# Patient Record
Sex: FEMALE | Race: WHITE | URBAN - NONMETROPOLITAN AREA
[De-identification: names, ages, dates, MRNs, and addresses within clinical notes are randomized per-mention and may not be internally consistent; named-entity substitution may affect disease eponyms.]

---

## 2023-02-14 ENCOUNTER — HOSPITAL ENCOUNTER (EMERGENCY)
Facility: HOSPITAL | Age: 53
Discharge: HOME | End: 2023-02-14
Attending: PHYSICIAN ASSISTANT | Admitting: PHYSICIAN ASSISTANT
Payer: COMMERCIAL

## 2023-02-14 VITALS
RESPIRATION RATE: 18 BRPM | TEMPERATURE: 98.2 F | OXYGEN SATURATION: 97 % | SYSTOLIC BLOOD PRESSURE: 152 MMHG | DIASTOLIC BLOOD PRESSURE: 88 MMHG | HEART RATE: 90 BPM

## 2023-02-14 DIAGNOSIS — R00.2 PALPITATIONS: ICD-10-CM

## 2023-02-14 DIAGNOSIS — U07.1 INFECTION DUE TO 2019 NOVEL CORONAVIRUS: Primary | ICD-10-CM

## 2023-02-14 LAB
ANION GAP SERPL CALCULATED.3IONS-SCNC: 12 MMOL/L (ref 7–15)
BASOPHILS # BLD AUTO: 0.1 10E3/UL (ref 0–0.2)
BASOPHILS NFR BLD AUTO: 1 %
BUN SERPL-MCNC: 4.9 MG/DL (ref 6–20)
CALCIUM SERPL-MCNC: 9.4 MG/DL (ref 8.6–10)
CHLORIDE SERPL-SCNC: 96 MMOL/L (ref 98–107)
CREAT SERPL-MCNC: 0.69 MG/DL (ref 0.51–0.95)
D DIMER PPP FEU-MCNC: 0.36 UG/ML FEU (ref 0–0.5)
DEPRECATED HCO3 PLAS-SCNC: 26 MMOL/L (ref 22–29)
EOSINOPHIL # BLD AUTO: 0 10E3/UL (ref 0–0.7)
EOSINOPHIL NFR BLD AUTO: 0 %
ERYTHROCYTE [DISTWIDTH] IN BLOOD BY AUTOMATED COUNT: 12 % (ref 10–15)
GFR SERPL CREATININE-BSD FRML MDRD: >90 ML/MIN/1.73M2
GLUCOSE SERPL-MCNC: 115 MG/DL (ref 70–99)
HCT VFR BLD AUTO: 41.1 % (ref 35–47)
HGB BLD-MCNC: 14 G/DL (ref 11.7–15.7)
HOLD SPECIMEN: NORMAL
HOLD SPECIMEN: NORMAL
IMM GRANULOCYTES # BLD: 0 10E3/UL
IMM GRANULOCYTES NFR BLD: 1 %
LYMPHOCYTES # BLD AUTO: 1.4 10E3/UL (ref 0.8–5.3)
LYMPHOCYTES NFR BLD AUTO: 21 %
MCH RBC QN AUTO: 30.1 PG (ref 26.5–33)
MCHC RBC AUTO-ENTMCNC: 34.1 G/DL (ref 31.5–36.5)
MCV RBC AUTO: 88 FL (ref 78–100)
MONOCYTES # BLD AUTO: 1 10E3/UL (ref 0–1.3)
MONOCYTES NFR BLD AUTO: 16 %
NEUTROPHILS # BLD AUTO: 4.1 10E3/UL (ref 1.6–8.3)
NEUTROPHILS NFR BLD AUTO: 61 %
NRBC # BLD AUTO: 0 10E3/UL
NRBC BLD AUTO-RTO: 0 /100
PLATELET # BLD AUTO: 358 10E3/UL (ref 150–450)
POTASSIUM SERPL-SCNC: 3.9 MMOL/L (ref 3.4–5.3)
RBC # BLD AUTO: 4.65 10E6/UL (ref 3.8–5.2)
SODIUM SERPL-SCNC: 134 MMOL/L (ref 136–145)
TROPONIN T SERPL HS-MCNC: <6 NG/L
WBC # BLD AUTO: 6.6 10E3/UL (ref 4–11)

## 2023-02-14 PROCEDURE — 85041 AUTOMATED RBC COUNT: CPT | Performed by: PHYSICIAN ASSISTANT

## 2023-02-14 PROCEDURE — 36415 COLL VENOUS BLD VENIPUNCTURE: CPT | Performed by: PHYSICIAN ASSISTANT

## 2023-02-14 PROCEDURE — 85379 FIBRIN DEGRADATION QUANT: CPT | Performed by: PHYSICIAN ASSISTANT

## 2023-02-14 PROCEDURE — 82310 ASSAY OF CALCIUM: CPT | Performed by: PHYSICIAN ASSISTANT

## 2023-02-14 PROCEDURE — 84484 ASSAY OF TROPONIN QUANT: CPT | Performed by: PHYSICIAN ASSISTANT

## 2023-02-14 PROCEDURE — 93005 ELECTROCARDIOGRAM TRACING: CPT

## 2023-02-14 PROCEDURE — 93010 ELECTROCARDIOGRAM REPORT: CPT | Performed by: INTERNAL MEDICINE

## 2023-02-14 PROCEDURE — 99284 EMERGENCY DEPT VISIT MOD MDM: CPT | Performed by: PHYSICIAN ASSISTANT

## 2023-02-14 PROCEDURE — 85014 HEMATOCRIT: CPT | Performed by: PHYSICIAN ASSISTANT

## 2023-02-14 PROCEDURE — 99284 EMERGENCY DEPT VISIT MOD MDM: CPT

## 2023-02-14 RX ORDER — GINSENG 100 MG
50 CAPSULE ORAL
COMMUNITY

## 2023-02-14 RX ORDER — LISINOPRIL 10 MG/1
1 TABLET ORAL DAILY
COMMUNITY
Start: 2022-06-15

## 2023-02-14 RX ORDER — CYANOCOBALAMIN (VITAMIN B-12) 500 MCG
LOZENGE ORAL DAILY
COMMUNITY

## 2023-02-14 RX ORDER — SPIRONOLACTONE 25 MG/1
12.5 TABLET ORAL
COMMUNITY
Start: 2022-03-11

## 2023-02-14 RX ORDER — METFORMIN HCL 500 MG
1500 TABLET, EXTENDED RELEASE 24 HR ORAL
COMMUNITY
Start: 2022-05-17

## 2023-02-14 RX ORDER — ASCORBIC ACID 125 MG
TABLET,CHEWABLE ORAL
COMMUNITY

## 2023-02-14 RX ORDER — AMLODIPINE BESYLATE 5 MG/1
1 TABLET ORAL DAILY
COMMUNITY
Start: 2023-01-30

## 2023-02-14 RX ORDER — ESTRADIOL 0.1 MG/G
CREAM VAGINAL
COMMUNITY
Start: 2022-01-12

## 2023-02-14 ASSESSMENT — ENCOUNTER SYMPTOMS
COUGH: 1
CHILLS: 0
FATIGUE: 0
SORE THROAT: 1
SHORTNESS OF BREATH: 1
LIGHT-HEADEDNESS: 0
ACTIVITY CHANGE: 0
NAUSEA: 0
PALPITATIONS: 1
DIZZINESS: 0
FEVER: 0
HEADACHES: 1
APPETITE CHANGE: 0
VOMITING: 0

## 2023-02-14 ASSESSMENT — ACTIVITIES OF DAILY LIVING (ADL): ADLS_ACUITY_SCORE: 35

## 2023-02-14 NOTE — ED NOTES
"Pt presents today with having increased HR. Pt stated that its been elevated since her physical in January in the 115's, today at work she saw it at 128. No chest pain, just felt \"like I ran a 100 yard dash\"  noted it this weekend too.   Took 2 home COVID test today and both were positive, took home tests cause she was having headache, congested and sore throat.  Movement increases HR. Was place on amlodipine in January along with her lisinopril and spirolactone     "

## 2023-02-14 NOTE — ED PROVIDER NOTES
"  History     Chief Complaint   Patient presents with     Palpitations     The history is provided by the patient and medical records. No  was used.     Nguyen Bland is a 52 year old female who presents to the emergency department today independently from work. Patient reports that she has been experiencing a cough, congestion, sore throat and a headache since Sunday. She has also been more short of breath with her usual activity, which she thought was just because she is obese. Her heart is \"pounding\" and has been having an increased heart rate since January, and was seen in the clinic by her PCP. They started her on amlodipine, which she says makes her feel \"floaty\". Past history of meth abuse, last used 2007. Caffeine use today,20 oz, typical for daily. Took two covid tests today, both positive.     PMH: hypertension, LYNDON, obesity. Reviewed medical chart from clinic visit on 1/30/2023. Patient noted to have an increased heart rate, 111, during that visit.    Allergies:  Allergies   Allergen Reactions     Pcn [Penicillins]      Sulfa Drugs        Problem List:    There are no problems to display for this patient.       Past Medical History:    No past medical history on file.    Past Surgical History:    No past surgical history on file.    Family History:    No family history on file.    Social History:  Marital Status:  Single [1]  Social History     Tobacco Use     Smoking status: Never   Substance Use Topics     Alcohol use: Yes     Comment: weekly     Drug use: No        Medications:    amLODIPine (NORVASC) 5 MG tablet  estradiol (ESTRACE) 0.1 MG/GM vaginal cream  lisinopril (ZESTRIL) 10 MG tablet  metFORMIN (GLUCOPHAGE XR) 500 MG 24 hr tablet  nirmatrelvir and ritonavir (PAXLOVID) therapy pack  spironolactone (ALDACTONE) 25 MG tablet  vitamin E 400 units TABS  cholecalciferol 50 MCG (2000 UT) tablet  Magnesium Citrate 125 MG CAPS  zinc 50 MG TABS        Review of Systems "   Constitutional: Negative for activity change, appetite change, chills, fatigue and fever.   HENT: Positive for sore throat.    Respiratory: Positive for cough and shortness of breath.    Cardiovascular: Positive for palpitations. Negative for chest pain and leg swelling.   Gastrointestinal: Negative for nausea and vomiting.   Neurological: Positive for headaches. Negative for dizziness and light-headedness.       Physical Exam   BP: 157/83  Pulse: 109  Temp: 97.6  F (36.4  C)  Resp: 20  SpO2: 99 %      Physical Exam  Vitals and nursing note reviewed.   Constitutional:       General: She is not in acute distress.     Appearance: Normal appearance. She is not ill-appearing.   HENT:      Nose: Nose normal.      Mouth/Throat:      Mouth: Mucous membranes are moist.      Pharynx: Posterior oropharyngeal erythema present.   Cardiovascular:      Rate and Rhythm: Regular rhythm. Tachycardia present.      Pulses: Normal pulses.   Pulmonary:      Effort: Pulmonary effort is normal. No tachypnea or respiratory distress.      Breath sounds: Normal breath sounds.   Abdominal:      Palpations: Abdomen is soft.   Skin:     General: Skin is warm and dry.      Capillary Refill: Capillary refill takes less than 2 seconds.   Neurological:      Mental Status: She is alert and oriented to person, place, and time.   Psychiatric:         Mood and Affect: Mood normal.            EKG Interpretation:      Interpreted by Estela THOMPSON /Marcell Dugan PA-C  Time reviewed: 11:31 AM  Symptoms at time of EKG: palpitation   Rhythm: sinus tachycardia  Rate: 110-120  Axis: Normal  Ectopy: none  Conduction: normal  ST Segments/ T Waves: No ST-T wave changes  Q Waves: none  Comparison to prior: Unchanged 2017    Clinical Impression: non-specific EKG, possible left artrial enlargement based off p-wave amplitude        Results for orders placed or performed during the hospital encounter of 02/14/23 (from the past 24 hour(s))   Troponin T, High  Sensitivity   Result Value Ref Range    Troponin T, High Sensitivity <6 <=14 ng/L   CBC with platelets differential    Narrative    The following orders were created for panel order CBC with platelets differential.  Procedure                               Abnormality         Status                     ---------                               -----------         ------                     CBC with platelets and d...[[id number]]                      Final result                 Please view results for these tests on the individual orders.   D dimer quantitative   Result Value Ref Range    D-Dimer Quantitative 0.36 0.00 - 0.50 ug/mL FEU    Narrative    This D-dimer assay is intended for use in conjunction with a clinical pretest probability assessment model to exclude pulmonary embolism (PE) and deep venous thrombosis (DVT) in outpatients suspected of PE or DVT. The cut-off value is 0.50 ug/mL FEU.   Basic metabolic panel   Result Value Ref Range    Sodium 134 (L) 136 - 145 mmol/L    Potassium 3.9 3.4 - 5.3 mmol/L    Chloride 96 (L) 98 - 107 mmol/L    Carbon Dioxide (CO2) 26 22 - 29 mmol/L    Anion Gap 12 7 - 15 mmol/L    Urea Nitrogen 4.9 (L) 6.0 - 20.0 mg/dL    Creatinine 0.69 0.51 - 0.95 mg/dL    Calcium 9.4 8.6 - 10.0 mg/dL    Glucose 115 (H) 70 - 99 mg/dL    GFR Estimate >90 >60 mL/min/1.73m2   CBC with platelets and differential   Result Value Ref Range    WBC Count 6.6 4.0 - 11.0 10e3/uL    RBC Count 4.65 3.80 - 5.20 10e6/uL    Hemoglobin 14.0 11.7 - 15.7 g/dL    Hematocrit 41.1 35.0 - 47.0 %    MCV 88 78 - 100 fL    MCH 30.1 26.5 - 33.0 pg    MCHC 34.1 31.5 - 36.5 g/dL    RDW 12.0 10.0 - 15.0 %    Platelet Count 358 150 - 450 10e3/uL    % Neutrophils 61 %    % Lymphocytes 21 %    % Monocytes 16 %    % Eosinophils 0 %    % Basophils 1 %    % Immature Granulocytes 1 %    NRBCs per 100 WBC 0 <1 /100    Absolute Neutrophils 4.1 1.6 - 8.3 10e3/uL    Absolute Lymphocytes 1.4 0.8 - 5.3 10e3/uL    Absolute Monocytes 1.0  "0.0 - 1.3 10e3/uL    Absolute Eosinophils 0.0 0.0 - 0.7 10e3/uL    Absolute Basophils 0.1 0.0 - 0.2 10e3/uL    Absolute Immature Granulocytes 0.0 <=0.4 10e3/uL    Absolute NRBCs 0.0 10e3/uL   Extra Tube    Narrative    The following orders were created for panel order Extra Tube.  Procedure                               Abnormality         Status                     ---------                               -----------         ------                     Extra Red Top Tube[[id number]]                               Final result               Extra Heparinized Syringe[[id number]]                        Final result                 Please view results for these tests on the individual orders.   Extra Red Top Tube   Result Value Ref Range    Hold Specimen JIC    Extra Heparinized Syringe   Result Value Ref Range    Hold Specimen JIC        Medications - No data to display    Assessments & Plan (with Medical Decision Making)  Nguyen Bland is a 52 year old female who presents to the emergency department today independently from work. Patient reports that she has been experiencing a cough, congestion, sore throat and a headache since Sunday. She has also been more short of breath with her usual activity, which she thought was just because she is obese. Her heart is \"pounding\" and has been having an increased heart rate since January, and was seen in the clinic by her PCP. They started her on amlodipine, which she says makes her feel \"floaty\". Past history of meth abuse, last used 2007. Caffeine use today,20 oz, typical for daily. Took two covid tests today at home, both positive.  PMH: hypertension, LYNDON, obesity. Reviewed medical chart from clinic visit on 1/30/2023. Patient noted to have an increased heart rate, 111, during that visit.  Temp: 98.2  F (36.8  C) Temp src: Tympanic BP: 152/88 Pulse: 90   Resp: 18 SpO2: 97 % O2 Device: None (Room air)  on Room air  Labs: CBC: Unremarkable BMP:unremarkable Troponin: <6, normal, " DDimer: 0.39, negative  Patient's tachycardia is not new, based off of her reporting and recent clinic visit. She reports two positive covid tests today at home, unnecessary to repeat these here today. She has not had fevers, fatigue, chest pain. Her physical exam is unremarkable. Her lung sounds are clear. Lab work is negative for elevated D-dimer, making a pulmonary embolism unlikely today. Vitally, with the respect of an elevated heart rate just above 100, she is stable. A chest xray was thought about today, but given her physical examination today and re-assuring laboratory work up, I do not feel it is indicated at this time, bacterial pneumonia is less likely. This was discussed with patient, and she felt comfortable not having an xray today.  Patient will be discharged home in stable condition. She will be sent home with a get well loop, prescription for Paxlovid, and follow up as needed if symptoms worsen       This patient was seen in conjunction with IRLEY Lugo.  However, she was independently interviewed and examined by myself.  I agree with the above HPI, ROS, exam, and plan.  EKG shows no evidence of concerning findings.  D-dimer and troponin are negative.  She tested positive for COVID 2 days ago.  At this time I can find no reasonable indication for imaging.  She otherwise looks stable.  However she would fit any reasonable indication to start Paxlovid.  At this time I can find no reasonable indication for further evaluation on an emergent basis.  She was advised return here for any new or worsening symptoms.  Close clinic follow-up.  Get well loop was placed.      I have reviewed the nursing notes.    I have reviewed the findings, diagnosis, plan and need for follow up with the patient.      Medical Decision Making  The patient's presentation is strongly suggestive of an acute and uncomplicated illness or injury.    The patient's evaluation involved:  an assessment requiring an independent  historian (see separate area of note for details)  review of external note(s) from 1 sources (see separate area of note for details)  ordering and/or review of 3+ test(s) in this encounter (see separate area of note for details)    The patient's management involved prescription drug management (including medications given in the ED).        Discharge Medication List as of 2/14/2023  1:03 PM      START taking these medications    Details   nirmatrelvir and ritonavir (PAXLOVID) therapy pack Take 3 tablets by mouth 2 times daily for 5 days, Disp-30 tablet, R-0, E-PrescribeDate of symptom onset: 2/12; Risk criteria met: Yes; Positive Covid-19 test: Yes; Weight >40 kg Yes; Renal fxn: normal;  Drug-Drug interactions reviewed & addressed: No             Final diagnoses:   Infection due to 2019 novel coronavirus   Palpitations       2/14/2023   [facility]     Marcell Dugan PA-C  02/14/23 1508

## 2023-02-14 NOTE — ED TRIAGE NOTES
covid positive today has had URI since Sunday.today feels like heart is pounding and it was racing.denies chest pain       Triage Assessment     Row Name 02/14/23 1118       Respiratory WDL    Respiratory WDL WDL       Skin Circulation/Temperature WDL    Skin Circulation/Temperature WDL WDL       Cardiac WDL    Cardiac WDL X  feels its pounding       Peripheral/Neurovascular WDL    Peripheral Neurovascular WDL WDL       Cognitive/Neuro/Behavioral WDL    Cognitive/Neuro/Behavioral WDL WDL

## 2023-02-14 NOTE — DISCHARGE INSTRUCTIONS
Paxlovid as prescribed.     Rest and stay hydrated.     Monitor your oxygen saturations throughout the day and return here for any oxygen saturations of less than 90% at rest.    Follow the CDC guidelines for work continuation and returning to the public.

## 2023-11-20 ENCOUNTER — HOSPITAL ENCOUNTER (EMERGENCY)
Facility: HOSPITAL | Age: 53
Discharge: HOME | End: 2023-11-20
Attending: NURSE PRACTITIONER | Admitting: NURSE PRACTITIONER
Payer: COMMERCIAL

## 2023-11-20 VITALS
SYSTOLIC BLOOD PRESSURE: 172 MMHG | OXYGEN SATURATION: 95 % | HEART RATE: 105 BPM | WEIGHT: 210 LBS | TEMPERATURE: 99 F | RESPIRATION RATE: 18 BRPM | DIASTOLIC BLOOD PRESSURE: 92 MMHG

## 2023-11-20 DIAGNOSIS — J02.9 ACUTE PHARYNGITIS: Primary | ICD-10-CM

## 2023-11-20 LAB
FLUAV RNA SPEC QL NAA+PROBE: NEGATIVE
FLUBV RNA RESP QL NAA+PROBE: NEGATIVE
GROUP A STREP BY PCR: NOT DETECTED
RSV RNA SPEC NAA+PROBE: NEGATIVE
SARS-COV-2 RNA RESP QL NAA+PROBE: NEGATIVE

## 2023-11-20 PROCEDURE — 87637 SARSCOV2&INF A&B&RSV AMP PRB: CPT | Performed by: NURSE PRACTITIONER

## 2023-11-20 PROCEDURE — C9803 HOPD COVID-19 SPEC COLLECT: HCPCS

## 2023-11-20 PROCEDURE — G0463 HOSPITAL OUTPT CLINIC VISIT: HCPCS

## 2023-11-20 PROCEDURE — 99213 OFFICE O/P EST LOW 20 MIN: CPT | Performed by: NURSE PRACTITIONER

## 2023-11-20 PROCEDURE — 87651 STREP A DNA AMP PROBE: CPT | Performed by: NURSE PRACTITIONER

## 2023-11-20 ASSESSMENT — ENCOUNTER SYMPTOMS
COUGH: 0
APPETITE CHANGE: 0
SHORTNESS OF BREATH: 0
SORE THROAT: 1
FEVER: 0

## 2023-11-21 NOTE — DISCHARGE INSTRUCTIONS
We will notify you of your results when available.  Take Tylenol or ibuprofen as needed for the pain.  Drink plenty of fluids.    Follow-up with your doctor if no improvement in symptoms.    Return to urgent care or emergency department for any worsening or concerning symptoms.

## 2023-11-21 NOTE — ED PROVIDER NOTES
History     Chief Complaint   Patient presents with    Pharyngitis     HPI  Nguyen Bland is a 53 year old female who presents ambulatory to urgent care with concerns of strep throat.  Patient tells me that she has had a sore throat for 5 days.  No fever or chills.  No cough, chest pain, shortness of breath.  She notes that the roof of her mouth hurts as well.  Still eating and drinking okay.  No known recent ill contacts.    Patient is also requesting testing for COVID-19 infection.    Allergies:  Allergies   Allergen Reactions    Pcn [Penicillins]     Sulfa Antibiotics        Problem List:    There are no problems to display for this patient.       Past Medical History:    History reviewed. No pertinent past medical history.    Past Surgical History:    History reviewed. No pertinent surgical history.    Family History:    History reviewed. No pertinent family history.    Social History:  Marital Status:  Single [1]  Social History     Tobacco Use    Smoking status: Never   Substance Use Topics    Alcohol use: Yes     Comment: weekly    Drug use: No        Medications:    amLODIPine (NORVASC) 5 MG tablet  cholecalciferol 50 MCG (2000 UT) tablet  estradiol (ESTRACE) 0.1 MG/GM vaginal cream  lisinopril (ZESTRIL) 10 MG tablet  Magnesium Citrate 125 MG CAPS  metFORMIN (GLUCOPHAGE XR) 500 MG 24 hr tablet  spironolactone (ALDACTONE) 25 MG tablet  vitamin E 400 units TABS  zinc 50 MG TABS          Review of Systems   Constitutional:  Negative for appetite change and fever.   HENT:  Positive for sore throat.    Respiratory:  Negative for cough and shortness of breath.    All other systems reviewed and are negative.      Physical Exam   BP: 172/92  Pulse: 105  Temp: 99  F (37.2  C)  Resp: 18  Weight: 95.3 kg (210 lb)  SpO2: 95 %      Physical Exam  Vitals and nursing note reviewed.   Constitutional:       General: She is not in acute distress.     Appearance: Normal appearance. She is well-developed. She is not  ill-appearing or toxic-appearing.   HENT:      Head: Normocephalic and atraumatic.      Right Ear: Tympanic membrane and ear canal normal.      Left Ear: Tympanic membrane and ear canal normal.      Mouth/Throat:      Mouth: Mucous membranes are moist.      Pharynx: Uvula midline. Oropharyngeal exudate and posterior oropharyngeal erythema present.      Tonsils: No tonsillar exudate.   Eyes:      General: No scleral icterus.     Conjunctiva/sclera: Conjunctivae normal.   Cardiovascular:      Rate and Rhythm: Normal rate and regular rhythm.      Heart sounds: Normal heart sounds.   Pulmonary:      Effort: Pulmonary effort is normal. No respiratory distress.      Breath sounds: Normal breath sounds. No wheezing or rhonchi.   Abdominal:      General: Abdomen is flat.   Musculoskeletal:      Cervical back: Normal range of motion and neck supple.   Lymphadenopathy:      Cervical: No cervical adenopathy.   Skin:     General: Skin is warm and dry.      Coloration: Skin is not pale.   Neurological:      Mental Status: She is alert and oriented to person, place, and time.         ED Course                 Procedures            No results found for this or any previous visit (from the past 24 hour(s)).    Medications - No data to display    Assessments & Plan (with Medical Decision Making)   Well-appearing 53-year-old female that presented for evaluation of sore throat that started 5 days ago.  Patient concerned for strep throat but also requested testing for COVID-19 during this visit.  She does have some exudate noted to her uvula.  Erythema to her posterior pharynx was also appreciated.  No trismus.  No obvious tonsillar abscess.  Afebrile during this visit.  Strep test, COVID-19, influenza and RSV testing done with results pending.  Patient will be notified of results when they are available.  Patient opted to be discharged and called with results.    At this time I recommended Tylenol or ibuprofen as needed for the pain.   Push fluids.  Follow-up with primary doctor if no improvement in symptoms.  Return to urgent care or emergency department for any worsening or concerning symptoms.    I have reviewed the nursing notes.    I have reviewed the findings, diagnosis, plan and need for follow up with the patient.  This document was prepared using a combination of typing and voice generated software.  While every attempt was made for accuracy, spelling and grammatical errors may exist.         New Prescriptions    No medications on file       Final diagnoses:   Acute pharyngitis       11/20/2023   [facility]       Mpofu, Prudence, CNP  11/20/23 2011

## 2024-12-02 ENCOUNTER — APPOINTMENT (OUTPATIENT)
Dept: GENERAL RADIOLOGY | Facility: HOSPITAL | Age: 54
End: 2024-12-02
Payer: COMMERCIAL

## 2024-12-02 ENCOUNTER — HOSPITAL ENCOUNTER (EMERGENCY)
Facility: HOSPITAL | Age: 54
Discharge: HOME | End: 2024-12-02
Payer: COMMERCIAL

## 2024-12-02 VITALS
BODY MASS INDEX: 36.7 KG/M2 | RESPIRATION RATE: 18 BRPM | WEIGHT: 215 LBS | SYSTOLIC BLOOD PRESSURE: 162 MMHG | TEMPERATURE: 99.1 F | HEART RATE: 97 BPM | HEIGHT: 64 IN | OXYGEN SATURATION: 98 % | DIASTOLIC BLOOD PRESSURE: 92 MMHG

## 2024-12-02 DIAGNOSIS — B34.9 VIRAL ILLNESS: ICD-10-CM

## 2024-12-02 DIAGNOSIS — R59.0 CERVICAL LYMPHADENOPATHY: ICD-10-CM

## 2024-12-02 LAB
FLUAV RNA SPEC QL NAA+PROBE: NEGATIVE
FLUBV RNA RESP QL NAA+PROBE: NEGATIVE
GROUP A STREP BY PCR: NOT DETECTED
HOLD SPECIMEN: NORMAL
RSV RNA SPEC NAA+PROBE: NEGATIVE
SARS-COV-2 RNA RESP QL NAA+PROBE: NEGATIVE
TROPONIN T SERPL HS-MCNC: <6 NG/L

## 2024-12-02 PROCEDURE — 87637 SARSCOV2&INF A&B&RSV AMP PRB: CPT

## 2024-12-02 PROCEDURE — 84484 ASSAY OF TROPONIN QUANT: CPT

## 2024-12-02 PROCEDURE — 99213 OFFICE O/P EST LOW 20 MIN: CPT

## 2024-12-02 PROCEDURE — G0463 HOSPITAL OUTPT CLINIC VISIT: HCPCS | Mod: 25

## 2024-12-02 PROCEDURE — 87651 STREP A DNA AMP PROBE: CPT

## 2024-12-02 PROCEDURE — 36415 COLL VENOUS BLD VENIPUNCTURE: CPT

## 2024-12-02 PROCEDURE — 71046 X-RAY EXAM CHEST 2 VIEWS: CPT

## 2024-12-02 ASSESSMENT — ACTIVITIES OF DAILY LIVING (ADL): ADLS_ACUITY_SCORE: 41

## 2024-12-02 ASSESSMENT — ENCOUNTER SYMPTOMS
APPETITE CHANGE: 0
SORE THROAT: 1
HEADACHES: 1
SHORTNESS OF BREATH: 0
COUGH: 1
ACTIVITY CHANGE: 0
TROUBLE SWALLOWING: 1
FEVER: 1

## 2024-12-02 NOTE — ED PROVIDER NOTES
"  History     Chief Complaint   Patient presents with    Pharyngitis     HPI  Nguyen Bland is a 54 year old female who presents to the urgent care with complaints of pain in throat and neck that started last night. Low grade fever last night. She denies jaw pain, shoulder pain, chest pain, and shortness of breath. States she has had some voice changes and a cough for the last 3 years. No difficulty swallowing or opening mouth. No recent abx or OTC medications.     Allergies:  Allergies   Allergen Reactions    Pcn [Penicillins]     Sulfa Antibiotics        Problem List:    There are no active problems to display for this patient.       Past Medical History:    No past medical history on file.    Past Surgical History:    No past surgical history on file.    Family History:    No family history on file.    Social History:  Marital Status:  Single [1]  Social History     Tobacco Use    Smoking status: Never   Substance Use Topics    Alcohol use: Yes     Comment: weekly    Drug use: No        Medications:    cholecalciferol 50 MCG (2000 UT) tablet  lisinopril (ZESTRIL) 10 MG tablet  Magnesium Citrate 125 MG CAPS  metFORMIN (GLUCOPHAGE XR) 500 MG 24 hr tablet  spironolactone (ALDACTONE) 25 MG tablet  vitamin E 400 units TABS  zinc 50 MG TABS  amLODIPine (NORVASC) 5 MG tablet  estradiol (ESTRACE) 0.1 MG/GM vaginal cream          Review of Systems   Constitutional:  Positive for fever. Negative for activity change and appetite change.   HENT:  Positive for sore throat and trouble swallowing. Negative for congestion and ear pain.    Respiratory:  Positive for cough (chronic). Negative for shortness of breath.    Cardiovascular:  Negative for chest pain.   Neurological:  Positive for headaches.   All other systems reviewed and are negative.      Physical Exam   BP: 162/92  Pulse: 97  Temp: 99.1  F (37.3  C)  Resp: 18  Height: 162.6 cm (5' 4\")  Weight: 97.5 kg (215 lb)  SpO2: 98 %      Physical Exam  Vitals and nursing note " reviewed.   Constitutional:       General: She is not in acute distress.     Appearance: She is well-developed. She is not ill-appearing or toxic-appearing.   HENT:      Right Ear: Tympanic membrane is not erythematous.      Left Ear: Tympanic membrane is not erythematous.      Mouth/Throat:      Mouth: Mucous membranes are moist.      Pharynx: Oropharynx is clear. No posterior oropharyngeal erythema.   Neck:      Thyroid: No thyromegaly.   Cardiovascular:      Rate and Rhythm: Normal rate and regular rhythm.      Heart sounds: Normal heart sounds. No murmur heard.  Pulmonary:      Effort: Pulmonary effort is normal.      Breath sounds: Normal breath sounds. No wheezing, rhonchi or rales.   Lymphadenopathy:      Head:      Right side of head: Tonsillar adenopathy present.      Left side of head: No tonsillar adenopathy.      Cervical: Cervical adenopathy present.   Neurological:      Mental Status: She is alert.         ED Course        Procedures         Results for orders placed or performed during the hospital encounter of 12/02/24 (from the past 24 hours)   Group A Streptococcus PCR Throat Swab    Specimen: Throat; Swab   Result Value Ref Range    Group A strep by PCR Not Detected Not Detected    Narrative    The Xpert Xpress Strep A test, performed on the [employer], is a rapid, qualitative in vitro diagnostic test for the detection of Streptococcus pyogenes (Group A ß-hemolytic Streptococcus, Strep A) in throat swab specimens from patients with signs and symptoms of pharyngitis. The Xpert Xpress Strep A test can be used as an aid in the diagnosis of Group A Streptococcal pharyngitis. The assay is not intended to monitor treatment for Group A Streptococcus infections. The Xpert Xpress Strep A test utilizes an automated real-time polymerase chain reaction (PCR) to detect Streptococcus pyogenes DNA.   Influenza A/B, RSV and SARS-CoV2 PCR (COVID-19) Nose    Specimen: Nose; Swab   Result Value  Ref Range    Influenza A PCR Negative Negative    Influenza B PCR Negative Negative    RSV PCR Negative Negative    SARS CoV2 PCR Negative Negative    Narrative    Testing was performed using the Xpert Xpress CoV2/Flu/RSV Assay on the [employer] Gene[employer]pert Instrument. This test should be ordered for the detection of SARS-CoV2, influenza, and RSV viruses in individuals with signs and symptoms of respiratory tract infection. This test is for in vitro diagnostic use under the [place] FDA for laboratories certified under CLIA to perform high or moderate complexity testing. This test has been [place] FDA cleared. A negative result does not rule out the presence of PCR inhibitors in the specimen or target RNA in concentration below the limit of detection for the assay. If only one viral target is positive but coinfection with multiple targets is suspected, the sample should be re-tested with another FDA cleared, approved, or authorized test, if coninfection would change clinical management. This test was validated by the [facility]. These laboratories are certified under the Clinical Laboratory Improvement Amendments of 1988 (CLIA-88) as qualified to perfom high complexity laboratory testing.   Troponin T, High Sensitivity   Result Value Ref Range    Troponin T, High Sensitivity <6 <=14 ng/L   Extra Tube    Narrative    The following orders were created for panel order Extra Tube.  Procedure                               Abnormality         Status                     ---------                               -----------         ------                     Extra Blue Top Tube[[id number]]                              In process                 Extra Red Top Tube[[id number]]                               In process                 Extra Purple Top Tube[[id number]]                            In process                   Please view results for these tests on the individual orders.   Chest XR,  PA & LAT    Narrative    XR  CHEST 2 VIEWS    HISTORY: 54 years Female cough    COMPARISON: None    TECHNIQUE: 2 views of the chest were obtained.    FINDINGS: Two views of the chest were obtained. Heart size and  pulmonary vascularity are within normal limits, lungs are clear on  both views. No consolidating air space opacities are present.          Impression    IMPRESSION: Clear chest.    ARUN GARCIA MD         SYSTEM ID:  [id number]       Medications - No data to display    Assessments & Plan (with Medical Decision Making)     I have reviewed the nursing notes.    I have reviewed the findings, diagnosis, plan and need for follow up with the patient.  Nguyen Bland is a 54 year old female who presents to the urgent care with complaints of pain in throat and neck that started last night. Low grade fever last night. She denies jaw pain, shoulder pain, chest pain, and shortness of breath. States she has had some voice changes and a cough for the last 3 years. No difficulty swallowing or opening mouth. No recent abx or OTC medications.     MDM: differential include, but not limited to, viral illness, strep pharyngitis, pharyngitis, abscess, reflux, ACS, thyroid dysfunction, and dental abscess.     Her vital signs are normal, temp 99.1. non toxic in appearance with no noted distress. Able to speak in complete sentences without dyspnea. Lungs clear, heart tones regular. Palpable tonsillar lymph node on right, near location where she is having the full feeling. No redness, bruising, lymphangitis, or palpable fluctuance to lymph node. Less likely cellulitis or abscess. She is also having anterior lower neck pain, which is not reproducible, and headaches. Discussed differentials. Cannot exclude ACS without labs and EKG. She declines EKG but is agreeable to troponin, which is negative at this time. CXR with clear chest, per radiologist. Covid and strep negative. Most likely viral with the palpable lymphadenopathy and low grade fevers. Encouraged  close follow up in clinic. Supportive measures and strict return precautions discussed. She is in agreement with plan.     (B34.9) Viral illness,   (R59.0) Cervical lymphadenopathy  Plan: Follow up in the clinic for a recheck.   Tylenol and ibuprofen as directed if needed.   Return with any new or concerning symptoms. Understanding verbalized.     Discharge Medication List as of 12/2/2024  6:54 PM          Final diagnoses:   Viral illness   Cervical lymphadenopathy       12/2/2024   [facility]       Mckayla Bran, NP  12/02/24 [id number]

## 2025-03-28 ENCOUNTER — HOSPITAL ENCOUNTER (EMERGENCY)
Facility: HOSPITAL | Age: 55
Discharge: HOME | End: 2025-03-28
Attending: NURSE PRACTITIONER
Payer: COMMERCIAL

## 2025-03-28 VITALS
DIASTOLIC BLOOD PRESSURE: 101 MMHG | RESPIRATION RATE: 16 BRPM | OXYGEN SATURATION: 100 % | SYSTOLIC BLOOD PRESSURE: 181 MMHG | HEART RATE: 95 BPM | TEMPERATURE: 98.4 F

## 2025-03-28 DIAGNOSIS — H61.22 IMPACTED CERUMEN OF LEFT EAR: Primary | ICD-10-CM

## 2025-03-28 PROCEDURE — G0463 HOSPITAL OUTPT CLINIC VISIT: HCPCS

## 2025-03-28 PROCEDURE — 99213 OFFICE O/P EST LOW 20 MIN: CPT | Performed by: NURSE PRACTITIONER

## 2025-03-28 RX ORDER — GUAIFENESIN 600 MG/1
TABLET, EXTENDED RELEASE ORAL DAILY
COMMUNITY

## 2025-03-28 ASSESSMENT — ENCOUNTER SYMPTOMS
RHINORRHEA: 0
PSYCHIATRIC NEGATIVE: 1
SHORTNESS OF BREATH: 0
VOMITING: 0
FEVER: 0
EYE REDNESS: 0
EYE DISCHARGE: 0
NAUSEA: 0
SORE THROAT: 0
CHILLS: 0
DIARRHEA: 0

## 2025-03-28 ASSESSMENT — COLUMBIA-SUICIDE SEVERITY RATING SCALE - C-SSRS
6. HAVE YOU EVER DONE ANYTHING, STARTED TO DO ANYTHING, OR PREPARED TO DO ANYTHING TO END YOUR LIFE?: NO
2. HAVE YOU ACTUALLY HAD ANY THOUGHTS OF KILLING YOURSELF IN THE PAST MONTH?: NO
1. IN THE PAST MONTH, HAVE YOU WISHED YOU WERE DEAD OR WISHED YOU COULD GO TO SLEEP AND NOT WAKE UP?: NO

## 2025-03-28 NOTE — ED TRIAGE NOTES
Reports ears are plugged with wax. Denies pain. Left worse than right. Tried wax drops without results.

## 2025-03-28 NOTE — ED PROVIDER NOTES
"  History   No chief complaint on file.    HPI  Nguyen Bland is a 54 year old female who presents to urgent care today ambulatory with complaints of left ear being plugged.  Patient states she was seen last month and started on Debrox.  Denies any ear pain.  Denies any ear drainage.  Denies any fever, chills, nausea, vomiting, diarrhea, shortness of breath or chest pain.  Takes Allegra for seasonal allergies.  No other concerns.    Allergies:  Allergies   Allergen Reactions    Hydrochlorothiazide Rash     Pt stated she got\" red hot \" around her neck and chest    Pcn [Penicillins]     Sulfa Antibiotics        Problem List:    There are no active problems to display for this patient.       Past Medical History:    No past medical history on file.    Past Surgical History:    No past surgical history on file.    Family History:    No family history on file.    Social History:  Marital Status:  Single [1]  Social History     Tobacco Use    Smoking status: Never   Substance Use Topics    Alcohol use: Yes     Comment: weekly    Drug use: No        Medications:    lisinopril (ZESTRIL) 10 MG tablet  multivitamins w/minerals liquid  spironolactone (ALDACTONE) 25 MG tablet  tirzepatide-Weight Management (ZEPBOUND) 2.5 MG/0.5ML prefilled pen  amLODIPine (NORVASC) 5 MG tablet  cholecalciferol 50 MCG (2000 UT) tablet  estradiol (ESTRACE) 0.1 MG/GM vaginal cream  Magnesium Citrate 125 MG CAPS  metFORMIN (GLUCOPHAGE XR) 500 MG 24 hr tablet  vitamin E 400 units TABS  zinc 50 MG TABS      Review of Systems   Constitutional:  Negative for chills and fever.   HENT:  Positive for hearing loss (left ear). Negative for congestion, ear discharge, ear pain, rhinorrhea and sore throat.         Left ear feels plugged   Eyes:  Negative for discharge and redness.   Respiratory:  Negative for shortness of breath.    Cardiovascular:  Negative for chest pain.   Gastrointestinal:  Negative for diarrhea, nausea and vomiting. "   Psychiatric/Behavioral: Negative.       Physical Exam   BP: (!) 181/101  Pulse: 95  Temp: 98.4  F (36.9  C)  Resp: 16  SpO2: 100 %    Physical Exam  Vitals and nursing note reviewed.   Constitutional:       General: She is not in acute distress.     Appearance: Normal appearance. She is not ill-appearing or toxic-appearing.   HENT:      Right Ear: Tympanic membrane, ear canal and external ear normal.      Left Ear: Tympanic membrane, ear canal and external ear normal. There is impacted cerumen.   Cardiovascular:      Rate and Rhythm: Normal rate and regular rhythm.      Pulses: Normal pulses.      Heart sounds: Normal heart sounds.   Pulmonary:      Effort: Pulmonary effort is normal.      Breath sounds: Normal breath sounds.   Neurological:      Mental Status: She is alert.   Psychiatric:         Mood and Affect: Mood normal.       ED Course     Procedures    No results found for this or any previous visit (from the past 24 hours).    Medications - No data to display    Assessments & Plan (with Medical Decision Making)     I have reviewed the nursing notes.    I have reviewed the findings, diagnosis, plan and need for follow up with the patient.  (H61.22) Impacted cerumen of left ear  (primary encounter diagnosis)  Plan:   Patient ambulatory with a nontoxic appearance.  Impacted cerumen to left ear canal, removed via curette.  Hearing improved and ear does not feel plugged.  Denies ear pain.  No drainage.  Denies any fever, chills, nausea, vomiting, diarrhea, shortness of breath or chest pain.  Patient to follow-up with primary care provider or return to urgent care/ED with any worsening in condition or additional concerns.  Patient in agreement treatment plan.    New Prescriptions    No medications on file     Final diagnoses:   Impacted cerumen of left ear     3/28/2025   [facility]       Paris Zamora NP  03/28/25 [id number]

## 2025-03-28 NOTE — DISCHARGE INSTRUCTIONS
Follow-up with primary care provider or return to urgent care/ED with any worsening in condition or additional concerns